# Patient Record
Sex: FEMALE | Race: OTHER | NOT HISPANIC OR LATINO | URBAN - METROPOLITAN AREA
[De-identification: names, ages, dates, MRNs, and addresses within clinical notes are randomized per-mention and may not be internally consistent; named-entity substitution may affect disease eponyms.]

---

## 2021-05-26 ENCOUNTER — INPATIENT (INPATIENT)
Facility: HOSPITAL | Age: 26
LOS: 0 days | Discharge: HOME | DRG: 343 | End: 2021-05-26
Attending: SURGERY | Admitting: SURGERY
Payer: COMMERCIAL

## 2021-05-26 VITALS
DIASTOLIC BLOOD PRESSURE: 83 MMHG | WEIGHT: 214.95 LBS | TEMPERATURE: 99 F | OXYGEN SATURATION: 97 % | RESPIRATION RATE: 18 BRPM | HEART RATE: 77 BPM | SYSTOLIC BLOOD PRESSURE: 132 MMHG

## 2021-05-26 LAB
ALBUMIN SERPL ELPH-MCNC: 3.7 G/DL — SIGNIFICANT CHANGE UP (ref 3.4–5)
ALP SERPL-CCNC: 107 U/L — SIGNIFICANT CHANGE UP (ref 40–120)
ALT FLD-CCNC: 27 U/L — SIGNIFICANT CHANGE UP (ref 12–42)
ANION GAP SERPL CALC-SCNC: 11 MMOL/L — SIGNIFICANT CHANGE UP (ref 9–16)
APPEARANCE UR: ABNORMAL
APTT BLD: 28.7 SEC — SIGNIFICANT CHANGE UP (ref 27.5–35.5)
AST SERPL-CCNC: 18 U/L — SIGNIFICANT CHANGE UP (ref 15–37)
BACTERIA # UR AUTO: PRESENT /HPF
BASOPHILS # BLD AUTO: 0.05 K/UL — SIGNIFICANT CHANGE UP (ref 0–0.2)
BASOPHILS NFR BLD AUTO: 0.3 % — SIGNIFICANT CHANGE UP (ref 0–2)
BILIRUB SERPL-MCNC: 0.4 MG/DL — SIGNIFICANT CHANGE UP (ref 0.2–1.2)
BILIRUB UR-MCNC: NEGATIVE — SIGNIFICANT CHANGE UP
BLD GP AB SCN SERPL QL: NEGATIVE — SIGNIFICANT CHANGE UP
BUN SERPL-MCNC: 10 MG/DL — SIGNIFICANT CHANGE UP (ref 7–23)
CALCIUM SERPL-MCNC: 8.9 MG/DL — SIGNIFICANT CHANGE UP (ref 8.5–10.5)
CHLORIDE SERPL-SCNC: 104 MMOL/L — SIGNIFICANT CHANGE UP (ref 96–108)
CO2 SERPL-SCNC: 23 MMOL/L — SIGNIFICANT CHANGE UP (ref 22–31)
COLOR SPEC: YELLOW — SIGNIFICANT CHANGE UP
CREAT SERPL-MCNC: 0.77 MG/DL — SIGNIFICANT CHANGE UP (ref 0.5–1.3)
DIFF PNL FLD: ABNORMAL
EOSINOPHIL # BLD AUTO: 0.03 K/UL — SIGNIFICANT CHANGE UP (ref 0–0.5)
EOSINOPHIL NFR BLD AUTO: 0.2 % — SIGNIFICANT CHANGE UP (ref 0–6)
EPI CELLS # UR: ABNORMAL /HPF (ref 0–5)
GLUCOSE SERPL-MCNC: 122 MG/DL — HIGH (ref 70–99)
GLUCOSE UR QL: NEGATIVE — SIGNIFICANT CHANGE UP
HCG UR QL: NEGATIVE — SIGNIFICANT CHANGE UP
HCT VFR BLD CALC: 38.5 % — SIGNIFICANT CHANGE UP (ref 34.5–45)
HGB BLD-MCNC: 12.5 G/DL — SIGNIFICANT CHANGE UP (ref 11.5–15.5)
IMM GRANULOCYTES NFR BLD AUTO: 0.4 % — SIGNIFICANT CHANGE UP (ref 0–1.5)
INR BLD: 1.26 — HIGH (ref 0.88–1.16)
KETONES UR-MCNC: NEGATIVE — SIGNIFICANT CHANGE UP
LEUKOCYTE ESTERASE UR-ACNC: NEGATIVE — SIGNIFICANT CHANGE UP
LIDOCAIN IGE QN: 77 U/L — SIGNIFICANT CHANGE UP (ref 73–393)
LYMPHOCYTES # BLD AUTO: 1.65 K/UL — SIGNIFICANT CHANGE UP (ref 1–3.3)
LYMPHOCYTES # BLD AUTO: 11 % — LOW (ref 13–44)
MAGNESIUM SERPL-MCNC: 2.1 MG/DL — SIGNIFICANT CHANGE UP (ref 1.6–2.6)
MCHC RBC-ENTMCNC: 30.8 PG — SIGNIFICANT CHANGE UP (ref 27–34)
MCHC RBC-ENTMCNC: 32.5 GM/DL — SIGNIFICANT CHANGE UP (ref 32–36)
MCV RBC AUTO: 94.8 FL — SIGNIFICANT CHANGE UP (ref 80–100)
MONOCYTES # BLD AUTO: 0.68 K/UL — SIGNIFICANT CHANGE UP (ref 0–0.9)
MONOCYTES NFR BLD AUTO: 4.5 % — SIGNIFICANT CHANGE UP (ref 2–14)
NEUTROPHILS # BLD AUTO: 12.49 K/UL — HIGH (ref 1.8–7.4)
NEUTROPHILS NFR BLD AUTO: 83.6 % — HIGH (ref 43–77)
NITRITE UR-MCNC: NEGATIVE — SIGNIFICANT CHANGE UP
NRBC # BLD: 0 /100 WBCS — SIGNIFICANT CHANGE UP (ref 0–0)
PH UR: 5.5 — SIGNIFICANT CHANGE UP (ref 5–8)
PLATELET # BLD AUTO: 329 K/UL — SIGNIFICANT CHANGE UP (ref 150–400)
POTASSIUM SERPL-MCNC: 4.2 MMOL/L — SIGNIFICANT CHANGE UP (ref 3.5–5.3)
POTASSIUM SERPL-SCNC: 4.2 MMOL/L — SIGNIFICANT CHANGE UP (ref 3.5–5.3)
PROT SERPL-MCNC: 7.7 G/DL — SIGNIFICANT CHANGE UP (ref 6.4–8.2)
PROT UR-MCNC: NEGATIVE MG/DL — SIGNIFICANT CHANGE UP
PROTHROM AB SERPL-ACNC: 14.9 SEC — HIGH (ref 10.6–13.6)
RBC # BLD: 4.06 M/UL — SIGNIFICANT CHANGE UP (ref 3.8–5.2)
RBC # FLD: 12.6 % — SIGNIFICANT CHANGE UP (ref 10.3–14.5)
RBC CASTS # UR COMP ASSIST: < 5 /HPF — SIGNIFICANT CHANGE UP
RH IG SCN BLD-IMP: POSITIVE — SIGNIFICANT CHANGE UP
SARS-COV-2 RNA SPEC QL NAA+PROBE: SIGNIFICANT CHANGE UP
SODIUM SERPL-SCNC: 138 MMOL/L — SIGNIFICANT CHANGE UP (ref 132–145)
SP GR SPEC: 1.02 — SIGNIFICANT CHANGE UP (ref 1–1.03)
UROBILINOGEN FLD QL: 0.2 E.U./DL — SIGNIFICANT CHANGE UP
WBC # BLD: 14.96 K/UL — HIGH (ref 3.8–10.5)
WBC # FLD AUTO: 14.96 K/UL — HIGH (ref 3.8–10.5)
WBC UR QL: < 5 /HPF — SIGNIFICANT CHANGE UP

## 2021-05-26 PROCEDURE — 99285 EMERGENCY DEPT VISIT HI MDM: CPT

## 2021-05-26 PROCEDURE — 74177 CT ABD & PELVIS W/CONTRAST: CPT | Mod: 26

## 2021-05-26 PROCEDURE — 93010 ELECTROCARDIOGRAM REPORT: CPT

## 2021-05-26 PROCEDURE — 88304 TISSUE EXAM BY PATHOLOGIST: CPT | Mod: 26

## 2021-05-26 RX ORDER — MORPHINE SULFATE 50 MG/1
4 CAPSULE, EXTENDED RELEASE ORAL ONCE
Refills: 0 | Status: DISCONTINUED | OUTPATIENT
Start: 2021-05-26 | End: 2021-05-26

## 2021-05-26 RX ORDER — BUPIVACAINE 13.3 MG/ML
20 INJECTION, SUSPENSION, LIPOSOMAL INFILTRATION ONCE
Refills: 0 | Status: DISCONTINUED | OUTPATIENT
Start: 2021-05-26 | End: 2021-05-26

## 2021-05-26 RX ORDER — ONDANSETRON 8 MG/1
4 TABLET, FILM COATED ORAL EVERY 6 HOURS
Refills: 0 | Status: DISCONTINUED | OUTPATIENT
Start: 2021-05-26 | End: 2021-05-26

## 2021-05-26 RX ORDER — ONDANSETRON 8 MG/1
4 TABLET, FILM COATED ORAL ONCE
Refills: 0 | Status: COMPLETED | OUTPATIENT
Start: 2021-05-26 | End: 2021-05-26

## 2021-05-26 RX ORDER — HEPARIN SODIUM 5000 [USP'U]/ML
5000 INJECTION INTRAVENOUS; SUBCUTANEOUS EVERY 8 HOURS
Refills: 0 | Status: DISCONTINUED | OUTPATIENT
Start: 2021-05-26 | End: 2021-05-26

## 2021-05-26 RX ORDER — KETOROLAC TROMETHAMINE 30 MG/ML
15 SYRINGE (ML) INJECTION EVERY 6 HOURS
Refills: 0 | Status: DISCONTINUED | OUTPATIENT
Start: 2021-05-26 | End: 2021-05-26

## 2021-05-26 RX ORDER — CEFTRIAXONE 500 MG/1
1000 INJECTION, POWDER, FOR SOLUTION INTRAMUSCULAR; INTRAVENOUS ONCE
Refills: 0 | Status: COMPLETED | OUTPATIENT
Start: 2021-05-26 | End: 2021-05-26

## 2021-05-26 RX ORDER — METRONIDAZOLE 500 MG
TABLET ORAL
Refills: 0 | Status: DISCONTINUED | OUTPATIENT
Start: 2021-05-26 | End: 2021-05-26

## 2021-05-26 RX ORDER — SODIUM CHLORIDE 9 MG/ML
1000 INJECTION, SOLUTION INTRAVENOUS
Refills: 0 | Status: DISCONTINUED | OUTPATIENT
Start: 2021-05-26 | End: 2021-05-26

## 2021-05-26 RX ORDER — DOCUSATE SODIUM 100 MG
1 CAPSULE ORAL
Qty: 6 | Refills: 0
Start: 2021-05-26 | End: 2021-05-28

## 2021-05-26 RX ORDER — METRONIDAZOLE 500 MG
500 TABLET ORAL ONCE
Refills: 0 | Status: COMPLETED | OUTPATIENT
Start: 2021-05-26 | End: 2021-05-26

## 2021-05-26 RX ORDER — METRONIDAZOLE 500 MG
500 TABLET ORAL ONCE
Refills: 0 | Status: DISCONTINUED | OUTPATIENT
Start: 2021-05-26 | End: 2021-05-26

## 2021-05-26 RX ORDER — SODIUM CHLORIDE 9 MG/ML
1000 INJECTION INTRAMUSCULAR; INTRAVENOUS; SUBCUTANEOUS ONCE
Refills: 0 | Status: COMPLETED | OUTPATIENT
Start: 2021-05-26 | End: 2021-05-26

## 2021-05-26 RX ORDER — SODIUM CHLORIDE 9 MG/ML
1000 INJECTION INTRAMUSCULAR; INTRAVENOUS; SUBCUTANEOUS
Refills: 0 | Status: DISCONTINUED | OUTPATIENT
Start: 2021-05-26 | End: 2021-05-26

## 2021-05-26 RX ORDER — OXYCODONE AND ACETAMINOPHEN 5; 325 MG/1; MG/1
1 TABLET ORAL EVERY 6 HOURS
Refills: 0 | Status: DISCONTINUED | OUTPATIENT
Start: 2021-05-26 | End: 2021-05-26

## 2021-05-26 RX ORDER — METRONIDAZOLE 500 MG
500 TABLET ORAL EVERY 8 HOURS
Refills: 0 | Status: DISCONTINUED | OUTPATIENT
Start: 2021-05-26 | End: 2021-05-26

## 2021-05-26 RX ORDER — ACETAMINOPHEN 500 MG
650 TABLET ORAL EVERY 6 HOURS
Refills: 0 | Status: DISCONTINUED | OUTPATIENT
Start: 2021-05-26 | End: 2021-05-26

## 2021-05-26 RX ADMIN — MORPHINE SULFATE 4 MILLIGRAM(S): 50 CAPSULE, EXTENDED RELEASE ORAL at 09:04

## 2021-05-26 RX ADMIN — Medication 15 MILLIGRAM(S): at 21:16

## 2021-05-26 RX ADMIN — MORPHINE SULFATE 4 MILLIGRAM(S): 50 CAPSULE, EXTENDED RELEASE ORAL at 09:06

## 2021-05-26 RX ADMIN — ONDANSETRON 4 MILLIGRAM(S): 8 TABLET, FILM COATED ORAL at 07:21

## 2021-05-26 RX ADMIN — MORPHINE SULFATE 4 MILLIGRAM(S): 50 CAPSULE, EXTENDED RELEASE ORAL at 07:21

## 2021-05-26 RX ADMIN — MORPHINE SULFATE 4 MILLIGRAM(S): 50 CAPSULE, EXTENDED RELEASE ORAL at 11:14

## 2021-05-26 RX ADMIN — SODIUM CHLORIDE 100 MILLILITER(S): 9 INJECTION INTRAMUSCULAR; INTRAVENOUS; SUBCUTANEOUS at 08:41

## 2021-05-26 RX ADMIN — Medication 100 MILLIGRAM(S): at 08:43

## 2021-05-26 RX ADMIN — CEFTRIAXONE 1000 MILLIGRAM(S): 500 INJECTION, POWDER, FOR SOLUTION INTRAMUSCULAR; INTRAVENOUS at 09:02

## 2021-05-26 RX ADMIN — SODIUM CHLORIDE 1000 MILLILITER(S): 9 INJECTION INTRAMUSCULAR; INTRAVENOUS; SUBCUTANEOUS at 07:21

## 2021-05-26 RX ADMIN — SODIUM CHLORIDE 1000 MILLILITER(S): 9 INJECTION INTRAMUSCULAR; INTRAVENOUS; SUBCUTANEOUS at 08:12

## 2021-05-26 RX ADMIN — Medication 500 MILLIGRAM(S): at 11:14

## 2021-05-26 RX ADMIN — SODIUM CHLORIDE 1000 MILLILITER(S): 9 INJECTION INTRAMUSCULAR; INTRAVENOUS; SUBCUTANEOUS at 08:41

## 2021-05-26 RX ADMIN — MORPHINE SULFATE 4 MILLIGRAM(S): 50 CAPSULE, EXTENDED RELEASE ORAL at 08:12

## 2021-05-26 RX ADMIN — CEFTRIAXONE 100 MILLIGRAM(S): 500 INJECTION, POWDER, FOR SOLUTION INTRAMUSCULAR; INTRAVENOUS at 08:40

## 2021-05-26 NOTE — H&P ADULT - ATTENDING COMMENTS
Patient seen and examined, CT reviewed.  AFVSS  TTP RLQ    A/P: Acute appendicitis.  1. OR for lap/open appendectomy. Risks reviewed. She agrees to proceed.  2. Ceftriaxone/flagyl given.

## 2021-05-26 NOTE — PROGRESS NOTE ADULT - SUBJECTIVE AND OBJECTIVE BOX
POST-OPERATIVE NOTE    Procedure: Laparoscopic appendectomy    Diagnosis/Indication: Acute appendicitis    Surgeon: Dr. Kenny    S: The patient tolerated the procedure well, she was examined at the bedside, her POC was WNL. The patient denies having any complaints at the moment. Her pain is well-controlled w/ medications. She denies having nausea, vomiting, chills, dizziness, chest pain, SOB.    O:  T(C): 37.2 (05-26-21 @ 17:34), Max: 37.3 (05-26-21 @ 16:09)  T(F): 99 (05-26-21 @ 17:34), Max: 99.2 (05-26-21 @ 16:09)  HR: 80 (05-26-21 @ 17:34) (76 - 86)  BP: 103/68 (05-26-21 @ 17:34) (103/68 - 110/56)  RR: 18 (05-26-21 @ 17:34) (18 - 21)  SpO2: 96% (05-26-21 @ 17:34) (96% - 100%)  Wt(kg): --                        12.5   14.96 )-----------( 329      ( 26 May 2021 07:34 )             38.5     05-26    138  |  104  |  10  ----------------------------<  122<H>  4.2   |  23  |  0.77    Ca    8.9      26 May 2021 07:34  Mg     2.1     05-26    TPro  7.7  /  Alb  3.7  /  TBili  0.4  /  DBili  x   /  AST  18  /  ALT  27  /  AlkPhos  107  05-26      Gen: NAD, resting comfortably in bed  C/V: NSR, S1, S2 present  Pulm: Nonlabored breathing, no respiratory distress  Abd: soft, NT/ND, no rebound tenderness, guarding, rigidity, incisions C/D/I  Extrem: no edema  Neuro: AAOx3      A/P: 25 yo F w/ no PMH p/w abd pain x1 day found on CT to have acute appendicitis s/p laparoscopic appendectomy (5/26).    Regular diet  Pain/nausea control  OOBA/SCDs/IS  AM labs  Possible d/c

## 2021-05-26 NOTE — DISCHARGE NOTE PROVIDER - NSDCMRMEDTOKEN_GEN_ALL_CORE_FT
Colace 100 mg oral capsule: 1 cap(s) orally 2 times a day, As Needed -for constipation   Endocet 5/325 oral tablet: 1 tab(s) orally every 6 hours, As Needed -for severe pain MDD:4

## 2021-05-26 NOTE — DISCHARGE NOTE NURSING/CASE MANAGEMENT/SOCIAL WORK - PATIENT PORTAL LINK FT
You can access the FollowMyHealth Patient Portal offered by [facility] by registering at the following website: [email or web address]. By joining [facility]’s FollowMyHealth portal, you will also be able to view your health information using other applications (apps) compatible with our system.

## 2021-05-26 NOTE — DISCHARGE NOTE PROVIDER - CARE PROVIDER_API CALL
Marysol Kenny)  ColonRectal Surgery; Surgery  [address] [place] [address], [address]  [place] [address]  Phone: [phone]  Fax: [phone]  Follow Up Time:

## 2021-05-26 NOTE — H&P ADULT - HISTORY OF PRESENT ILLNESS
26F w/ no PMHx p/w abdominal pain x1 day. Patient states pain began last night at 7pm after eating an apple. It started epigastric and moved to right lower quadrant. Pain was a/w nausea and vomiting x4. No improvement with tylenol and no changed in bowel habits. No sick contacts.

## 2021-05-26 NOTE — ED PROVIDER NOTE - OBJECTIVE STATEMENT
26 yof pw abd pain x 10 hr, initially epigastrium, now feels more in rlq, nausea, anorexia, no urinary sx, no flank pain, chills but no fever.  lmp 2 wk ago.  no prior abd surg.  did not eat dinner last night due to no appetite.

## 2021-05-26 NOTE — DISCHARGE NOTE PROVIDER - NSDCCPCAREPLAN_GEN_ALL_CORE_FT
PRINCIPAL DISCHARGE DIAGNOSIS  Diagnosis: Acute appendicitis, unspecified acute appendicitis type  Assessment and Plan of Treatment:

## 2021-05-26 NOTE — H&P ADULT - ASSESSMENT
26F w/ no PMHx p/w abd pain x1 day found on CT to have acute appendicitis    NPO/IVF  Ceftriaxone/flagyl  Pain/nausea control  OOBA  Plan for OR for lap appendectomy  D/w chief and attending on call

## 2021-05-26 NOTE — DISCHARGE NOTE PROVIDER - HOSPITAL COURSE
26F w/ no PMHx p/w abd pain x1 day found on CT to have acute appendicitis s/p lap appendectomy. Patient's post-operative course was uncomplicated. Diet was advanced as tolerated and pain was well controlled on medication. On day of discharge, pt deemed stable and ready to return home with plan to follow up as an outpatient.

## 2021-05-26 NOTE — ED ADULT NURSE REASSESSMENT NOTE - NS ED NURSE REASSESS COMMENT FT1
rec'd pt A+Ox3 requesting for pain medication for her RLQ pain that started last night- Md made aware- abx 1 of 2 started. will monitor closely- pt states nausea has improved

## 2021-05-26 NOTE — DISCHARGE NOTE PROVIDER - NSDCFUADDAPPT_GEN_ALL_CORE_FT
Please follow up with Dr. Kenny in 1-2 weeks; you may call the office to make an appointment at your earliest convenience.

## 2021-05-26 NOTE — DISCHARGE NOTE PROVIDER - NSDCFUADDINST_GEN_ALL_CORE_FT
-For pain, you may take over-the-counter Tylenol and/or NSAIDs (such as Motrin/Advil) as labeled, as needed. For breakthrough pain you may take Percocet, which contains Tylenol. Do NOT exceed 4000mg acetaminophen/Tylenol total within 24 hours. Please take Colace 1 tab twice daily while taking narcotic pain medication to avoid constipation.  -No heavy lifting >20 pounds or strenuous exercise.  -You may remove your bandages 48 hours after surgery. Please keep wounds clean and dry.   -You may shower but NO baths and NO swimming. Keep your incisions clean & dry. Avoid a direct stream of water over incision sites. Do not scrub. Pat dry when done.  -Do not remove any Steri-strips if you have them in place; they will fall off on their own after a few showers.  -Contact your doctor or go to the ER for fever > 101.5, chills, nausea, vomiting, chest pain, shortness of breath, pain not controlled by medication or excessive bleeding.

## 2021-05-26 NOTE — ED ADULT NURSE NOTE - OBJECTIVE STATEMENT
26y female presents to ED c/o abdominal pain. Pt states she has had RLQ pain x10 hours accompanied with nausea and four episodes of vomiting. Denies PMH, daily meds. A&Ox4.

## 2021-05-26 NOTE — ED PROVIDER NOTE - CLINICAL SUMMARY MEDICAL DECISION MAKING FREE TEXT BOX
migratory abd pain to rlq, +tenderness, +rovsing, +obturator/psoas, no rebound/guarding, anorexia, nausea, BMI >30, zofran for antiemetics, CT w/ IV contrast, analgesia prn

## 2021-05-26 NOTE — H&P ADULT - NSHPLABSRESULTS_GEN_ALL_CORE
LABS:                        12.5   14.96 )-----------( 329      ( 26 May 2021 07:34 )             38.5     05-26    138  |  104  |  10  ----------------------------<  122<H>  4.2   |  23  |  0.77    Ca    8.9      26 May 2021 07:34  Mg     2.1     05-26    TPro  7.7  /  Alb  3.7  /  TBili  0.4  /  DBili  x   /  AST  18  /  ALT  27  /  AlkPhos  107  05-26    PT/INR - ( 26 May 2021 13:42 )   PT: 14.9 sec;   INR: 1.26          PTT - ( 26 May 2021 13:42 )  PTT:28.7 sec      RADIOLOGY & ADDITIONAL STUDIES:  CT Abdomen and Pelvis w/ IV Cont:   EXAM:  CT ABDOMEN AND PELVIS IC                           PROCEDURE DATE:  05/26/2021          INTERPRETATION:  CT SCAN OF ABDOMEN AND PELVIS    History: Migratory right lower quadrant pain.    Technique: CT scan of abdomen and pelvis was performedfrom lung bases through symphysis pubis. 96 cc Optiray 350 IV contrast was utilized. Axial, sagittal and coronal reformatted images were reviewed.    Comparison: None.    Findings:    Lower chest: Normal.    Liver:  Normal.    Gallbladder: No radiopaque stones gallbladder.    Spleen:  Normal.    Pancreas:  Normal.    Adrenal glands:  Normal.    Kidneys: Normal.    Adenopathy:  No lymphadenopathy in abdomen or pelvis.    Ascites: Trace free fluid in the cul-de-sac.    Gastrointestinal tract: Limited without oral contrast. 5 mm radiopaque appendicolith in the proximal appendix with downstream dilatation of a fluid-filled appendix measuring up to 1.4 cm in maximal diameter with mild wall enhancement. The appendix is retroileal. Mild periappendiceal fat stranding without a drainable fluid collection. No free air. The remaining bowel is unremarkable. No evidence of obstruction.    Vessels: Normal.    Pelvic organs: Uterus and ovaries/adnexa are unremarkable. Bladder is unremarkable.  .  Soft tissues: Normal.    Bones: Bilateral L5 pars interarticularis defect. No spondylolisthesis.    Impression:  Findings are consistent with acute appendicitis with 5 mm appendicolith in the base of appendix. No drainable fluid collection or free air. No macro perforation.              Thank you for the opportunity to participate in the care of this patient.    QUINCY VAUGHN M.D., RADIOLOGY RESIDENT  This document has been electronically signed.  NATHANIEL PATEL MD; Attending Radiologist  This document has been electronically signed. May 26 2021  8:54AM (05-26-21 @ 08:27)

## 2021-05-26 NOTE — H&P ADULT - NSHPPHYSICALEXAM_GEN_ALL_CORE
Vital Signs Last 24 Hrs  T(C): 36.9 (26 May 2021 13:51), Max: 37.2 (26 May 2021 06:19)  T(F): 98.5 (26 May 2021 13:51), Max: 98.9 (26 May 2021 06:19)  HR: 75 (26 May 2021 13:51) (72 - 77)  BP: 119/75 (26 May 2021 13:51) (117/78 - 132/83)  BP(mean): --  RR: 18 (26 May 2021 13:51) (17 - 18)  SpO2: 100% (26 May 2021 13:51) (97% - 100%)    General: NAD, resting comfortably in bed  C/V: NSR  Pulm: Nonlabored breathing, no respiratory distress  Abd: soft, nondistended, TTP in RLQ  Extrem: SCDs in place

## 2021-05-26 NOTE — ED ADULT TRIAGE NOTE - CHIEF COMPLAINT QUOTE
Pt walked in c/o abd pain. Denies significant past medical hx, endorsing +n/v. States has irregular menstruations. Denies cp, fevers, chills, changes in bm or urination.

## 2021-05-26 NOTE — BRIEF OPERATIVE NOTE - OPERATION/FINDINGS
Carolee direct cutdown supra-umbilical. UR-6 stitch was placed in Figure of 8 fashion. Trocar was placed. Abdomen was insufflated. The appendix was visualized. The ports were placed under direct visualization. The appendix had some omental adhesions which were bluntly dissected. A Ligasure was used to ligate the meso-appendix. A PDS Endoloop x2 was used to ligate the appendix at its base. The appendix was divided above the Endoloop. Endocatch used to retrieve the specimen. Abdomen inspected, suctioned some free fluid in pelvis. Hemostasis in surgical field. Fascia was closed with the previous Vicryl. Monocryl for skin.

## 2021-05-27 VITALS
TEMPERATURE: 98 F | OXYGEN SATURATION: 99 % | SYSTOLIC BLOOD PRESSURE: 101 MMHG | DIASTOLIC BLOOD PRESSURE: 65 MMHG | RESPIRATION RATE: 17 BRPM | HEART RATE: 88 BPM

## 2021-06-01 LAB — SURGICAL PATHOLOGY STUDY: SIGNIFICANT CHANGE UP

## 2021-06-03 DIAGNOSIS — R10.9 UNSPECIFIED ABDOMINAL PAIN: ICD-10-CM

## 2021-06-03 DIAGNOSIS — E66.9 OBESITY, UNSPECIFIED: ICD-10-CM

## 2021-06-03 DIAGNOSIS — K35.80 UNSPECIFIED ACUTE APPENDICITIS: ICD-10-CM

## 2021-06-22 PROCEDURE — 96366 THER/PROPH/DIAG IV INF ADDON: CPT

## 2021-06-22 PROCEDURE — 85730 THROMBOPLASTIN TIME PARTIAL: CPT

## 2021-06-22 PROCEDURE — 80053 COMPREHEN METABOLIC PANEL: CPT

## 2021-06-22 PROCEDURE — 36415 COLL VENOUS BLD VENIPUNCTURE: CPT

## 2021-06-22 PROCEDURE — 83690 ASSAY OF LIPASE: CPT

## 2021-06-22 PROCEDURE — 86901 BLOOD TYPING SEROLOGIC RH(D): CPT

## 2021-06-22 PROCEDURE — 96365 THER/PROPH/DIAG IV INF INIT: CPT

## 2021-06-22 PROCEDURE — 81025 URINE PREGNANCY TEST: CPT

## 2021-06-22 PROCEDURE — 83735 ASSAY OF MAGNESIUM: CPT

## 2021-06-22 PROCEDURE — 86900 BLOOD TYPING SEROLOGIC ABO: CPT

## 2021-06-22 PROCEDURE — 96375 TX/PRO/DX INJ NEW DRUG ADDON: CPT

## 2021-06-22 PROCEDURE — 86850 RBC ANTIBODY SCREEN: CPT

## 2021-06-22 PROCEDURE — 81001 URINALYSIS AUTO W/SCOPE: CPT

## 2021-06-22 PROCEDURE — 99285 EMERGENCY DEPT VISIT HI MDM: CPT | Mod: 25

## 2021-06-22 PROCEDURE — 85025 COMPLETE CBC W/AUTO DIFF WBC: CPT

## 2021-06-22 PROCEDURE — 87635 SARS-COV-2 COVID-19 AMP PRB: CPT

## 2021-06-22 PROCEDURE — 74177 CT ABD & PELVIS W/CONTRAST: CPT

## 2021-06-22 PROCEDURE — 88304 TISSUE EXAM BY PATHOLOGIST: CPT

## 2021-06-22 PROCEDURE — 96367 TX/PROPH/DG ADDL SEQ IV INF: CPT

## 2021-06-22 PROCEDURE — 85610 PROTHROMBIN TIME: CPT

## 2021-06-22 PROCEDURE — 93005 ELECTROCARDIOGRAM TRACING: CPT

## 2022-12-01 NOTE — PATIENT PROFILE ADULT - PUBLIC BENEFITS
Problem: Discharge Planning  Goal: Discharge to home or other facility with appropriate resources  12/1/2022 0439 by Tierra Lewis RN  Outcome: Progressing     Problem: Safety - Adult  Goal: Free from fall injury  12/1/2022 0439 by Tierra Lewis RN  Outcome: Progressing     Problem: ABCDS Injury Assessment  Goal: Absence of physical injury  12/1/2022 0439 by Tierra Lewis RN  Outcome: Progressing     Problem: Skin/Tissue Integrity  Goal: Absence of new skin breakdown  Description: 1. Monitor for areas of redness and/or skin breakdown  2. Assess vascular access sites hourly  3. Every 4-6 hours minimum:  Change oxygen saturation probe site  4. Every 4-6 hours:  If on nasal continuous positive airway pressure, respiratory therapy assess nares and determine need for appliance change or resting period.   12/1/2022 0439 by Tierra Lewis RN  Outcome: Progressing no